# Patient Record
Sex: FEMALE | Race: WHITE
[De-identification: names, ages, dates, MRNs, and addresses within clinical notes are randomized per-mention and may not be internally consistent; named-entity substitution may affect disease eponyms.]

---

## 2018-12-04 NOTE — HP
CC:  Dr. Huffman.*

 

HISTORY AND PHYSICAL:

 

DATE OF PLANNED ADMISSION AND SURGERY:  12/26/18

 

HISTORY OF PRESENT ILLNESS:  Mrs. Christine is a 63-year-old white female who is 
admitted with suspicious bladder lesions and atypical urine cytology for 
cystoscopy and excisional biopsies of bladder lesions.

 

Mrs. Christine is a chronic heavy smoker whom I have been following for the last 7 
years because of asymptomatic microscopic hematuria.  At her visits to my office
, she had periodic cystoscopies and urine cytologies and imaging of her kidneys 
and no abnormalities were noted.

 

5 months ago, she had a cystoscopy which was negative.  Urine cytology showed 
atypical cells.  She then had a CT urogram, which showed normal kidneys and 
ureters and no abnormal filling defects in the collecting systems or the 
ureters.  The CT scan showed cholelithiasis and osteopenia, but no  
abnormalities.

 

The patient then had a follow-up cystoscopy last month.  The study showed a new 
finding of flat, irregular, hyperemic lesions in the left base of the bladder 
that were suspicious for bladder tumors.

 

The patient is admitted for excisional biopsies and fulgurations of the above 
lesions.

 

PAST MEDICAL HISTORY AND SYSTEM REVIEW:  She is hypertensive, maintained on 
lisinopril 10 mg daily.  

She is on no other chronic medications.  She is a chronic heavy smoker.  

She denies any allergies to medications.

 

GYN History:  She is menopausal.  She is on no hormone replacement therapy.  
She had 1 vaginal delivery.

 

She has chronic back pain and known to have osteoporosis.

 

FAMILY HISTORY:  Negative.

 

                               PHYSICAL EXAMINATION

 

GENERAL:  Pleasant white female who looks older than her age.

 

VITAL SIGNS:  Blood pressure 150/80.

 

LUNGS:  Clear.  Occasional wheezing.

 

HEART:  Regular and rhythmic, no murmurs.

 

ABDOMEN:  She has negative abdomen and no CVA tenderness.

 

 IMPRESSION:  Microscopic hematuria in a chronic smoker with suspicious lesions 
in the left base of the bladder on cystoscopy and normal kidneys and ureters on 
CT urogram.

 

PLAN/RECOMMENDATIONS:  For cystoscopy and excisional biopsies of the bladder 
lesions.  

I discussed the above plans in detail with the patient and her  and all 
their questions were answered.

 

[id number]/[facility] #: [id number]

MTDD

## 2018-12-26 NOTE — OP
CC:  Dr. Huffman*

 

OPERATIVE REPORT:

 

DATE OF OPERATION:  12/26/18 - [facility]

 

DATE OF BIRTH:  [birth year]

 

SURGEON:  Kirill Soliman MD

 

ANESTHESIOLOGIST:  Dr. Obey Schumacher.

 

ANESTHESIA:  General.

 

PRE-OP DIAGNOSIS:  Bladder lesions, left base bladder.

 

POST-OP DIAGNOSIS:  Pending pathology.

 

OPERATIVE PROCEDURE:

1.  Cystoscopy.

2.  Excisional biopsies and fulguration of lesions of left base of bladder (3 cm
).

 

INDICATION FOR PROCEDURE:  Mrs. Christine is a 63-year-old chronic heavy smoker, 
whom I have been following for several years because of asymptomatic 
microscopic hematuria.  She recently had atypical urine cytologies.  CT urogram 
was negative. Recent cystoscopy showed flat lesions in the left base of the 
bladder suspicious for transitional cell carcinoma.

 

The patient is admitted for biopsy & excision of the above lesions.

 

PATHOLOGY:  At cystoscopy, there were 3 flat lesions noted in the left base of 
the bladder, lateral to the left ureteral orifice.  The lesions had the gross 
appearance of flat well-differentiated transitional cell carcinoma.  The 
lesions measured a total of 3 cm.

 

The rest of the bladder wall looked normal.  There were no papillary lesions 
and no lesions suspicious of carcinoma in situ.  The ureteral orifices looked 
normal.

 

DESCRIPTION OF PROCEDURE:  After successful general anesthesia, the patient was 
placed in the lithotomy position and was prepped and draped for a cystoscopy. 
Cystoscopy was performed and the bladder was carefully inspected and the above 
findings were noted.

 

Using the rigid biopsy forceps, several biopsies were obtained from the 
lesions.  The biopsies were deep enough to include superficial muscle.  There 
was no bladder perforation noted.

 

Using the Bugbee electrode, the sites of the biopsies as well as the 
surrounding areas and all the irregular looking mucosa in the left base of the 
bladder were thoroughly fulgurated with the coagulation current.  At the end of 
the procedure, there were no residual lesions seen.  There was very good 
hemostasis and no evidence of bladder perforation.  The left ureteral orifice 
was intact.

 

The cystoscope was then removed and a size 16-Maltese Rosas catheter was passed 
inside the bladder and the balloon inflated with 10 cc of water.

 

The patient tolerated the procedure well and left the operating room in good 
condition.

 

The plan is to give the patient one dose of intravesical mitomycin C in the 
recovery room.

 

 [id number]/[facility] #: [id number]

[facility]

## 2019-11-19 ENCOUNTER — HOSPITAL ENCOUNTER (EMERGENCY)
Dept: HOSPITAL 25 - UCEAST | Age: 64
Discharge: HOME | End: 2019-11-19
Payer: COMMERCIAL

## 2019-11-19 VITALS — SYSTOLIC BLOOD PRESSURE: 158 MMHG | DIASTOLIC BLOOD PRESSURE: 99 MMHG
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
## 2019-11-19 DIAGNOSIS — S61.211A: Primary | ICD-10-CM

## 2019-11-19 DIAGNOSIS — I10: ICD-10-CM

## 2019-11-19 DIAGNOSIS — F17.210: ICD-10-CM

## 2019-11-19 DIAGNOSIS — M79.89: ICD-10-CM

## 2019-11-19 PROCEDURE — 12001 RPR S/N/AX/GEN/TRNK 2.5CM/<: CPT

## 2019-11-19 PROCEDURE — 73140 X-RAY EXAM OF FINGER(S): CPT

## 2019-11-19 PROCEDURE — G0463 HOSPITAL OUTPT CLINIC VISIT: HCPCS

## 2019-11-19 PROCEDURE — 99212 OFFICE O/P EST SF 10 MIN: CPT

## 2019-11-19 PROCEDURE — 90715 TDAP VACCINE 7 YRS/> IM: CPT

## 2019-11-19 NOTE — UC
Laceration HPI





- HPI Summary


HPI Summary: 





63 yo female presents with left index finger laceration. She tells me that she 

was moving a stove from a truck bed and the stove slid and landed on her left 

index finger. Sustained a laceration to the area. Bandaged the area and came to 

. Last tetanus was in 2019. She is right handed. 





- History Of Current Complaint


Chief Complaint: UCLaceration


Stated Complaint: FINGER LAC


Time Seen by Provider: 11/19/19 14:17


Laceration Location: Finger


Mechanism Of Injury: Blunt Trauma


Onset/Duration: Sudden Onset


Severity: Mild


Pain Intensity: 4


Pain Scale Used: 0-10 Numeric





- Allergies/Home Medications


Allergies/Adverse Reactions: 


 Allergies











Allergy/AdvReac Type Severity Reaction Status Date / Time


 


No Known Allergies Allergy   Verified 11/19/19 13:56














PMH/Surg Hx/FS Hx/Imm Hx


Cardiovascular History: Hypertension





- Surgical History


Surgical History: Yes


Surgery Procedure, Year, and Place: TEETH EXTRACTION.  POLYP REMOVED FROM 

THROAT.  RIGHT LUMPECTOMY-BENIGN.  bladder tumor removal





- Family History


Known Family History: Positive: None





- Social History


Lives: With Family


Alcohol Use: None


Substance Use Type: None


Smoking Status (MU): Heavy Every Day Tobacco Smoker


Type: Cigarettes


Amount Used/How Often: UP TO 1 PPD X 40+ YEARS


Length of Time of Smoking/Using Tobacco: 40 YRS


Have You Smoked in the Last Year: Yes


Household Exposure Type: Cigarettes





Review of Systems


All Other Systems Reviewed And Are Negative: No


Constitutional: Positive: Negative


Skin: Positive: Other - Laceration left index finger


Respiratory: Positive: Negative


Cardiovascular: Positive: Negative


Neurovascular: Positive: Negative


Musculoskeletal: Positive: Negative


Neurological: Positive: Negative


Psychological: Positive: Negative





Physical Exam





- Summary


Physical Exam Summary: 





GENERAL: NAD. WDWN. No pain distress.


SKIN: LEFT INDEX FINGER: Volar aspect overlying PIP with 2.5cm linear 

laceration partial thickness. Scant active bleeding. No tendon or bony 

involvement. 


CHEST:  No accessory muscle use. Breathing comfortably and in no distress.


CV:  Pulses intact. Cap refill <2seconds


MSK: FROM at left index finger MCP, PIP, and DIP


NEURO: Alert.


PSYCH: Age appropriate behavior.





Triage Information Reviewed: Yes


Vital Signs: 


 Initial Vital Signs











Temp  99 F   11/19/19 13:57


 


Pulse  105   11/19/19 13:57


 


Resp  18   11/19/19 13:57


 


BP  158/99   11/19/19 13:57


 


Pulse Ox  95   11/19/19 13:57











Vital Signs Reviewed: Yes





Laceration Repair





- Laceration Repair


  ** 1


Description: Linear


Laceration Size After Repair: Length (cm) - 2.5


Modified For Repair: No


Anesthesia Used: 2.0% Lido


Irrigation With Pressure Irrigation Device: Yes


Closure Material: Sutures - #4


Closure Method: Single Layer


Suture Of: Skin


Suture Type: Prolene - 5-0





Diagnostics





- Radiology


  ** Finger XR


Radiology Interpretation Completed By: Radiologist


Summary of Radiographic Findings: IMPRESSION: Soft tissue swelling at the 

proximal interphalangeal joint without fracture.





Laceration Course/Dx





- Course/Dx


Course Of Treatment: 





The procedure was explained to the pt and all questions were answered. A time 

out was performed, witnessed, and signed. The area was irrigated with 250mL 

sterile saline. 2mL of 2% lidocaine without epi was administered and good 

anesthetization was achieved. In the usual sterile fashion, FOUR 5-0 prolene 

interrupted sutures were placed. Homeostasis achieved. The wound was bandaged 

with tubegauze. Pt tolerated procedure well.





- Diagnosis


Provider Diagnosis: 


 Finger laceration








Discharge ED





- Sign-Out/Discharge


Documenting (check all that apply): Patient Departure


All imaging exams completed and their final reports reviewed: Yes





- Discharge Plan


Condition: Stable


Disposition: HOME


Prescriptions: 


Cephalexin CAP* [Keflex CAP*] 500 mg PO BID #10 cap


Patient Education Materials:  Finger Laceration (ED)


Referrals: 


Raffaele Huffman MD [Primary Care Provider] - 


Additional Instructions: 


If you develop a fever, shortness of breath, chest pain, new or worsening 

symptoms - please call your PCP or go to the ED immediately.


 





Your blood pressure was high at todays visit. Please see your primary provider 

within 4 weeks for recheck and re-evaluation.








1) Please keep the area bandage, clean, dry, and intact for the next 24-

48hours. Then change the bandage daily until sutures are removed.


2) If you develop a fever, colored or thick discharge, increased pain or 

swelling - please call your PCP or return for a wound check.


3) Please return in 10-12 days to have your FOUR sutures removed.








- Billing Disposition and Condition


Condition: STABLE


Disposition: Home